# Patient Record
Sex: FEMALE | Race: WHITE | NOT HISPANIC OR LATINO | Employment: OTHER | URBAN - METROPOLITAN AREA
[De-identification: names, ages, dates, MRNs, and addresses within clinical notes are randomized per-mention and may not be internally consistent; named-entity substitution may affect disease eponyms.]

---

## 2020-08-13 ENCOUNTER — TRANSCRIBE ORDERS (OUTPATIENT)
Dept: ADMINISTRATIVE | Facility: HOSPITAL | Age: 30
End: 2020-08-13

## 2020-08-13 DIAGNOSIS — N64.4 PAINFUL BREASTS: ICD-10-CM

## 2020-08-13 DIAGNOSIS — N63.25 BREAST LUMP ON LEFT SIDE AT 3 O'CLOCK POSITION: Primary | ICD-10-CM

## 2020-08-27 ENCOUNTER — HOSPITAL ENCOUNTER (OUTPATIENT)
Dept: MAMMOGRAPHY | Facility: HOSPITAL | Age: 30
Discharge: HOME | End: 2020-08-27

## 2020-08-27 ENCOUNTER — HOSPITAL ENCOUNTER (OUTPATIENT)
Dept: ULTRASOUND IMAGING | Facility: HOSPITAL | Age: 30
Discharge: HOME | End: 2020-08-27
Admitting: FAMILY MEDICINE

## 2020-08-27 DIAGNOSIS — N64.4 PAINFUL BREASTS: ICD-10-CM

## 2020-08-27 DIAGNOSIS — N63.25 BREAST LUMP ON LEFT SIDE AT 3 O'CLOCK POSITION: ICD-10-CM

## 2020-08-27 PROCEDURE — 76642 ULTRASOUND BREAST LIMITED: CPT | Performed by: RADIOLOGY

## 2020-08-27 PROCEDURE — G0279 TOMOSYNTHESIS, MAMMO: HCPCS

## 2020-08-27 PROCEDURE — 76642 ULTRASOUND BREAST LIMITED: CPT

## 2020-08-27 PROCEDURE — 77066 DX MAMMO INCL CAD BI: CPT | Performed by: RADIOLOGY

## 2020-08-27 PROCEDURE — 77062 BREAST TOMOSYNTHESIS BI: CPT | Performed by: RADIOLOGY

## 2020-08-27 PROCEDURE — 77066 DX MAMMO INCL CAD BI: CPT

## 2024-11-26 ENCOUNTER — TRANSCRIBE ORDERS (OUTPATIENT)
Dept: LAB | Facility: HOSPITAL | Age: 34
End: 2024-11-26
Payer: COMMERCIAL

## 2024-11-26 ENCOUNTER — LAB (OUTPATIENT)
Dept: LAB | Facility: HOSPITAL | Age: 34
End: 2024-11-26
Payer: COMMERCIAL

## 2024-11-26 DIAGNOSIS — L05.91 INFECTED PILONIDAL CYST: Primary | ICD-10-CM

## 2024-11-26 DIAGNOSIS — L05.91 INFECTED PILONIDAL CYST: ICD-10-CM

## 2024-11-26 LAB
DEPRECATED RDW RBC AUTO: 38.3 FL (ref 37–54)
ERYTHROCYTE [DISTWIDTH] IN BLOOD BY AUTOMATED COUNT: 12.5 % (ref 12.3–15.4)
HCT VFR BLD AUTO: 45.1 % (ref 34–46.6)
HGB BLD-MCNC: 14.9 G/DL (ref 12–15.9)
MCH RBC QN AUTO: 28.6 PG (ref 26.6–33)
MCHC RBC AUTO-ENTMCNC: 33 G/DL (ref 31.5–35.7)
MCV RBC AUTO: 86.6 FL (ref 79–97)
PLATELET # BLD AUTO: 403 10*3/MM3 (ref 140–450)
PMV BLD AUTO: 11.4 FL (ref 6–12)
RBC # BLD AUTO: 5.21 10*6/MM3 (ref 3.77–5.28)
WBC NRBC COR # BLD AUTO: 11.39 10*3/MM3 (ref 3.4–10.8)

## 2024-11-26 PROCEDURE — 85027 COMPLETE CBC AUTOMATED: CPT

## 2024-11-26 PROCEDURE — 36415 COLL VENOUS BLD VENIPUNCTURE: CPT

## 2024-11-26 PROCEDURE — 80053 COMPREHEN METABOLIC PANEL: CPT

## 2024-11-27 LAB
ALBUMIN SERPL-MCNC: 4.2 G/DL (ref 3.5–5.2)
ALBUMIN/GLOB SERPL: 1.3 G/DL
ALP SERPL-CCNC: 62 U/L (ref 39–117)
ALT SERPL W P-5'-P-CCNC: 21 U/L (ref 1–33)
ANION GAP SERPL CALCULATED.3IONS-SCNC: 13.1 MMOL/L (ref 5–15)
AST SERPL-CCNC: 21 U/L (ref 1–32)
BILIRUB SERPL-MCNC: 0.2 MG/DL (ref 0–1.2)
BUN SERPL-MCNC: 23 MG/DL (ref 6–20)
BUN/CREAT SERPL: 21.7 (ref 7–25)
CALCIUM SPEC-SCNC: 9.6 MG/DL (ref 8.6–10.5)
CHLORIDE SERPL-SCNC: 103 MMOL/L (ref 98–107)
CO2 SERPL-SCNC: 22.9 MMOL/L (ref 22–29)
CREAT SERPL-MCNC: 1.06 MG/DL (ref 0.57–1)
EGFRCR SERPLBLD CKD-EPI 2021: 71.3 ML/MIN/1.73
GLOBULIN UR ELPH-MCNC: 3.3 GM/DL
GLUCOSE SERPL-MCNC: 102 MG/DL (ref 65–99)
POTASSIUM SERPL-SCNC: 4.1 MMOL/L (ref 3.5–5.2)
PROT SERPL-MCNC: 7.5 G/DL (ref 6–8.5)
SODIUM SERPL-SCNC: 139 MMOL/L (ref 136–145)

## 2024-12-03 ENCOUNTER — ANESTHESIA EVENT (OUTPATIENT)
Dept: PERIOP | Facility: HOSPITAL | Age: 34
End: 2024-12-03
Payer: COMMERCIAL

## 2024-12-03 RX ORDER — FAMOTIDINE 10 MG/ML
20 INJECTION, SOLUTION INTRAVENOUS ONCE
Status: CANCELLED | OUTPATIENT
Start: 2024-12-03 | End: 2024-12-03

## 2024-12-04 ENCOUNTER — ANESTHESIA (OUTPATIENT)
Dept: PERIOP | Facility: HOSPITAL | Age: 34
End: 2024-12-04
Payer: COMMERCIAL

## 2024-12-04 ENCOUNTER — HOSPITAL ENCOUNTER (OUTPATIENT)
Facility: HOSPITAL | Age: 34
Setting detail: HOSPITAL OUTPATIENT SURGERY
Discharge: HOME | End: 2024-12-04
Attending: SURGERY | Admitting: SURGERY
Payer: COMMERCIAL

## 2024-12-04 VITALS
DIASTOLIC BLOOD PRESSURE: 77 MMHG | TEMPERATURE: 98 F | BODY MASS INDEX: 26.68 KG/M2 | RESPIRATION RATE: 16 BRPM | WEIGHT: 170 LBS | OXYGEN SATURATION: 97 % | HEIGHT: 67 IN | HEART RATE: 76 BPM | SYSTOLIC BLOOD PRESSURE: 123 MMHG

## 2024-12-04 DIAGNOSIS — L05.91 PILONIDAL CYST: ICD-10-CM

## 2024-12-04 LAB
ALBUMIN SERPL-MCNC: 3.8 G/DL (ref 3.5–5.2)
ALBUMIN/GLOB SERPL: 1.1 G/DL
ALP SERPL-CCNC: 64 U/L (ref 39–117)
ALT SERPL W P-5'-P-CCNC: 20 U/L (ref 1–33)
ANION GAP SERPL CALCULATED.3IONS-SCNC: 12 MMOL/L (ref 5–15)
AST SERPL-CCNC: 26 U/L (ref 1–32)
B-HCG UR QL: NEGATIVE
BILIRUB SERPL-MCNC: 0.3 MG/DL (ref 0–1.2)
BUN SERPL-MCNC: 18 MG/DL (ref 6–20)
BUN/CREAT SERPL: 19.6 (ref 7–25)
CALCIUM SPEC-SCNC: 9 MG/DL (ref 8.6–10.5)
CHLORIDE SERPL-SCNC: 105 MMOL/L (ref 98–107)
CO2 SERPL-SCNC: 21 MMOL/L (ref 22–29)
CREAT SERPL-MCNC: 0.92 MG/DL (ref 0.57–1)
DEPRECATED RDW RBC AUTO: 37.8 FL (ref 37–54)
EGFRCR SERPLBLD CKD-EPI 2021: 84.5 ML/MIN/1.73
ERYTHROCYTE [DISTWIDTH] IN BLOOD BY AUTOMATED COUNT: 12.5 % (ref 12.3–15.4)
EXPIRATION DATE: NORMAL
GLOBULIN UR ELPH-MCNC: 3.6 GM/DL
GLUCOSE SERPL-MCNC: 92 MG/DL (ref 65–99)
HCT VFR BLD AUTO: 41.6 % (ref 34–46.6)
HGB BLD-MCNC: 14.5 G/DL (ref 12–15.9)
INTERNAL NEGATIVE CONTROL: NEGATIVE
INTERNAL POSITIVE CONTROL: POSITIVE
Lab: NORMAL
MCH RBC QN AUTO: 28.8 PG (ref 26.6–33)
MCHC RBC AUTO-ENTMCNC: 34.9 G/DL (ref 31.5–35.7)
MCV RBC AUTO: 82.7 FL (ref 79–97)
PLATELET # BLD AUTO: 348 10*3/MM3 (ref 140–450)
PMV BLD AUTO: 10 FL (ref 6–12)
POTASSIUM SERPL-SCNC: 4.3 MMOL/L (ref 3.5–5.2)
PROT SERPL-MCNC: 7.4 G/DL (ref 6–8.5)
RBC # BLD AUTO: 5.03 10*6/MM3 (ref 3.77–5.28)
SODIUM SERPL-SCNC: 138 MMOL/L (ref 136–145)
WBC NRBC COR # BLD AUTO: 9.66 10*3/MM3 (ref 3.4–10.8)

## 2024-12-04 PROCEDURE — 81025 URINE PREGNANCY TEST: CPT | Performed by: ANESTHESIOLOGY

## 2024-12-04 PROCEDURE — 25010000002 CEFAZOLIN PER 500 MG: Performed by: SURGERY

## 2024-12-04 PROCEDURE — 80053 COMPREHEN METABOLIC PANEL: CPT | Performed by: SURGERY

## 2024-12-04 PROCEDURE — 25010000002 FENTANYL CITRATE (PF) 100 MCG/2ML SOLUTION: Performed by: NURSE ANESTHETIST, CERTIFIED REGISTERED

## 2024-12-04 PROCEDURE — 85027 COMPLETE CBC AUTOMATED: CPT | Performed by: SURGERY

## 2024-12-04 PROCEDURE — 25010000002 LIDOCAINE PF 1% 1 % SOLUTION: Performed by: NURSE ANESTHETIST, CERTIFIED REGISTERED

## 2024-12-04 PROCEDURE — 25010000002 PROPOFOL 10 MG/ML EMULSION: Performed by: NURSE ANESTHETIST, CERTIFIED REGISTERED

## 2024-12-04 PROCEDURE — 25010000002 LIDOCAINE PF 1% 1 % SOLUTION: Performed by: ANESTHESIOLOGY

## 2024-12-04 PROCEDURE — 88304 TISSUE EXAM BY PATHOLOGIST: CPT | Performed by: SURGERY

## 2024-12-04 PROCEDURE — 25010000002 ESMOLOL 100 MG/10ML SOLUTION: Performed by: NURSE ANESTHETIST, CERTIFIED REGISTERED

## 2024-12-04 PROCEDURE — 25010000002 ONDANSETRON PER 1 MG: Performed by: NURSE ANESTHETIST, CERTIFIED REGISTERED

## 2024-12-04 PROCEDURE — 25810000003 LACTATED RINGERS PER 1000 ML: Performed by: ANESTHESIOLOGY

## 2024-12-04 PROCEDURE — 25010000002 DEXAMETHASONE PER 1 MG: Performed by: NURSE ANESTHETIST, CERTIFIED REGISTERED

## 2024-12-04 PROCEDURE — 25010000002 SUGAMMADEX 200 MG/2ML SOLUTION: Performed by: NURSE ANESTHETIST, CERTIFIED REGISTERED

## 2024-12-04 RX ORDER — MEPERIDINE HYDROCHLORIDE 25 MG/ML
12.5 INJECTION INTRAMUSCULAR; INTRAVENOUS; SUBCUTANEOUS
Status: DISCONTINUED | OUTPATIENT
Start: 2024-12-04 | End: 2024-12-04 | Stop reason: HOSPADM

## 2024-12-04 RX ORDER — ROCURONIUM BROMIDE 10 MG/ML
INJECTION, SOLUTION INTRAVENOUS AS NEEDED
Status: DISCONTINUED | OUTPATIENT
Start: 2024-12-04 | End: 2024-12-04 | Stop reason: SURG

## 2024-12-04 RX ORDER — DEXAMETHASONE SODIUM PHOSPHATE 4 MG/ML
INJECTION, SOLUTION INTRA-ARTICULAR; INTRALESIONAL; INTRAMUSCULAR; INTRAVENOUS; SOFT TISSUE AS NEEDED
Status: DISCONTINUED | OUTPATIENT
Start: 2024-12-04 | End: 2024-12-04 | Stop reason: SURG

## 2024-12-04 RX ORDER — LIDOCAINE HYDROCHLORIDE 10 MG/ML
0.5 INJECTION, SOLUTION EPIDURAL; INFILTRATION; INTRACAUDAL; PERINEURAL ONCE AS NEEDED
Status: COMPLETED | OUTPATIENT
Start: 2024-12-04 | End: 2024-12-04

## 2024-12-04 RX ORDER — SODIUM CHLORIDE, SODIUM LACTATE, POTASSIUM CHLORIDE, CALCIUM CHLORIDE 600; 310; 30; 20 MG/100ML; MG/100ML; MG/100ML; MG/100ML
9 INJECTION, SOLUTION INTRAVENOUS CONTINUOUS
Status: DISCONTINUED | OUTPATIENT
Start: 2024-12-05 | End: 2024-12-04 | Stop reason: HOSPADM

## 2024-12-04 RX ORDER — DROPERIDOL 2.5 MG/ML
0.62 INJECTION, SOLUTION INTRAMUSCULAR; INTRAVENOUS ONCE AS NEEDED
Status: DISCONTINUED | OUTPATIENT
Start: 2024-12-04 | End: 2024-12-04 | Stop reason: HOSPADM

## 2024-12-04 RX ORDER — L.ACID,CASEI/B.ANIMAL/S.THERMO 16B CELL
1 CAPSULE ORAL DAILY
COMMUNITY

## 2024-12-04 RX ORDER — SODIUM CHLORIDE 0.9 % (FLUSH) 0.9 %
10 SYRINGE (ML) INJECTION AS NEEDED
Status: DISCONTINUED | OUTPATIENT
Start: 2024-12-04 | End: 2024-12-04 | Stop reason: HOSPADM

## 2024-12-04 RX ORDER — FAMOTIDINE 20 MG/1
20 TABLET, FILM COATED ORAL ONCE
Status: COMPLETED | OUTPATIENT
Start: 2024-12-04 | End: 2024-12-04

## 2024-12-04 RX ORDER — ONDANSETRON 2 MG/ML
4 INJECTION INTRAMUSCULAR; INTRAVENOUS ONCE AS NEEDED
Status: DISCONTINUED | OUTPATIENT
Start: 2024-12-04 | End: 2024-12-04 | Stop reason: HOSPADM

## 2024-12-04 RX ORDER — FENTANYL CITRATE 50 UG/ML
INJECTION, SOLUTION INTRAMUSCULAR; INTRAVENOUS AS NEEDED
Status: DISCONTINUED | OUTPATIENT
Start: 2024-12-04 | End: 2024-12-04 | Stop reason: SURG

## 2024-12-04 RX ORDER — ESMOLOL HYDROCHLORIDE 10 MG/ML
INJECTION INTRAVENOUS AS NEEDED
Status: DISCONTINUED | OUTPATIENT
Start: 2024-12-04 | End: 2024-12-04 | Stop reason: SURG

## 2024-12-04 RX ORDER — OXYCODONE AND ACETAMINOPHEN 5; 325 MG/1; MG/1
1 TABLET ORAL EVERY 4 HOURS PRN
Qty: 10 TABLET | Refills: 0 | Status: SHIPPED | OUTPATIENT
Start: 2024-12-04

## 2024-12-04 RX ORDER — FENTANYL CITRATE 50 UG/ML
50 INJECTION, SOLUTION INTRAMUSCULAR; INTRAVENOUS
Status: DISCONTINUED | OUTPATIENT
Start: 2024-12-04 | End: 2024-12-04 | Stop reason: HOSPADM

## 2024-12-04 RX ORDER — MIDAZOLAM HYDROCHLORIDE 1 MG/ML
1 INJECTION, SOLUTION INTRAMUSCULAR; INTRAVENOUS
Status: DISCONTINUED | OUTPATIENT
Start: 2024-12-04 | End: 2024-12-04 | Stop reason: HOSPADM

## 2024-12-04 RX ORDER — OXYCODONE AND ACETAMINOPHEN 5; 325 MG/1; MG/1
1 TABLET ORAL ONCE AS NEEDED
Status: DISCONTINUED | OUTPATIENT
Start: 2024-12-04 | End: 2024-12-04 | Stop reason: HOSPADM

## 2024-12-04 RX ORDER — HYDROMORPHONE HYDROCHLORIDE 1 MG/ML
0.5 INJECTION, SOLUTION INTRAMUSCULAR; INTRAVENOUS; SUBCUTANEOUS
Status: DISCONTINUED | OUTPATIENT
Start: 2024-12-04 | End: 2024-12-04 | Stop reason: HOSPADM

## 2024-12-04 RX ORDER — DOCUSATE SODIUM 100 MG/1
100 CAPSULE, LIQUID FILLED ORAL 2 TIMES DAILY PRN
Qty: 30 CAPSULE | Refills: 1 | Status: SHIPPED | OUTPATIENT
Start: 2024-12-04 | End: 2025-12-04

## 2024-12-04 RX ORDER — BUPIVACAINE HYDROCHLORIDE AND EPINEPHRINE 2.5; 5 MG/ML; UG/ML
INJECTION, SOLUTION EPIDURAL; INFILTRATION; INTRACAUDAL; PERINEURAL AS NEEDED
Status: DISCONTINUED | OUTPATIENT
Start: 2024-12-04 | End: 2024-12-04 | Stop reason: HOSPADM

## 2024-12-04 RX ORDER — DROSPIRENONE AND ETHINYL ESTRADIOL 0.02-3(28)
1 KIT ORAL DAILY
COMMUNITY
Start: 2024-10-26

## 2024-12-04 RX ORDER — PROPOFOL 10 MG/ML
VIAL (ML) INTRAVENOUS AS NEEDED
Status: DISCONTINUED | OUTPATIENT
Start: 2024-12-04 | End: 2024-12-04 | Stop reason: SURG

## 2024-12-04 RX ORDER — LIDOCAINE HYDROCHLORIDE 10 MG/ML
INJECTION, SOLUTION EPIDURAL; INFILTRATION; INTRACAUDAL; PERINEURAL AS NEEDED
Status: DISCONTINUED | OUTPATIENT
Start: 2024-12-04 | End: 2024-12-04 | Stop reason: SURG

## 2024-12-04 RX ORDER — ONDANSETRON 2 MG/ML
INJECTION INTRAMUSCULAR; INTRAVENOUS AS NEEDED
Status: DISCONTINUED | OUTPATIENT
Start: 2024-12-04 | End: 2024-12-04 | Stop reason: SURG

## 2024-12-04 RX ORDER — SODIUM CHLORIDE 0.9 % (FLUSH) 0.9 %
10 SYRINGE (ML) INJECTION EVERY 12 HOURS SCHEDULED
Status: DISCONTINUED | OUTPATIENT
Start: 2024-12-04 | End: 2024-12-04

## 2024-12-04 RX ADMIN — ONDANSETRON 4 MG: 2 INJECTION INTRAMUSCULAR; INTRAVENOUS at 13:05

## 2024-12-04 RX ADMIN — ROCURONIUM BROMIDE 50 MG: 10 INJECTION INTRAVENOUS at 12:33

## 2024-12-04 RX ADMIN — SODIUM CHLORIDE 2000 MG: 900 INJECTION INTRAVENOUS at 12:37

## 2024-12-04 RX ADMIN — DEXAMETHASONE SODIUM PHOSPHATE 8 MG: 4 INJECTION INTRA-ARTICULAR; INTRALESIONAL; INTRAMUSCULAR; INTRAVENOUS; SOFT TISSUE at 12:37

## 2024-12-04 RX ADMIN — ESMOLOL HYDROCHLORIDE 50 MG: 10 INJECTION, SOLUTION INTRAVENOUS at 12:43

## 2024-12-04 RX ADMIN — FENTANYL CITRATE 100 MCG: 50 INJECTION, SOLUTION INTRAMUSCULAR; INTRAVENOUS at 12:33

## 2024-12-04 RX ADMIN — SUGAMMADEX 200 MG: 100 INJECTION, SOLUTION INTRAVENOUS at 13:16

## 2024-12-04 RX ADMIN — FENTANYL CITRATE 100 MCG: 50 INJECTION, SOLUTION INTRAMUSCULAR; INTRAVENOUS at 13:18

## 2024-12-04 RX ADMIN — LIDOCAINE HYDROCHLORIDE 0.5 ML: 10 INJECTION, SOLUTION EPIDURAL; INFILTRATION; INTRACAUDAL; PERINEURAL at 12:00

## 2024-12-04 RX ADMIN — PROPOFOL 200 MG: 10 INJECTION, EMULSION INTRAVENOUS at 12:33

## 2024-12-04 RX ADMIN — FAMOTIDINE 20 MG: 20 TABLET, FILM COATED ORAL at 12:00

## 2024-12-04 RX ADMIN — SODIUM CHLORIDE, POTASSIUM CHLORIDE, SODIUM LACTATE AND CALCIUM CHLORIDE: 600; 310; 30; 20 INJECTION, SOLUTION INTRAVENOUS at 12:29

## 2024-12-04 RX ADMIN — ESMOLOL HYDROCHLORIDE 50 MG: 10 INJECTION, SOLUTION INTRAVENOUS at 13:19

## 2024-12-04 RX ADMIN — LIDOCAINE HYDROCHLORIDE 50 MG: 10 INJECTION, SOLUTION EPIDURAL; INFILTRATION; INTRACAUDAL; PERINEURAL at 12:33

## 2024-12-04 NOTE — H&P
"Pre-Op H&P  Wilfred Francisco  [id number]  1990    Chief complaint: \"Recurrent pilonidal infections\"    HPI:    Patient is a 34 y.o.female who presents with problems with recurrent pilonidal cysts and infections.  She has failed conservative therapy.  She is now admitted for surgical intervention and resection.    Review of Systems:  General ROS: negative for chills, fever or skin lesions;  No changes since last office visit.  Neg for recent sick exposure  Cardiovascular ROS: no chest pain or dyspnea on exertion  Respiratory ROS: no cough, shortness of breath, or wheezing    Allergies: No Known Allergies    Home Meds:    No current facility-administered medications on file prior to encounter.     Current Outpatient Medications on File Prior to Encounter   Medication Sig Dispense Refill    Marion 3-0.02 MG per tablet Take 1 tablet by mouth Daily.      Probiotic Product (Risaquad-2) capsule capsule Take 1 capsule by mouth Daily.         PMH:   Past Medical History:   Diagnosis Date    Asthma     exercise induced asthma denies any issues since college (2014)    PCOS (polycystic ovarian syndrome)      PSH:    Past Surgical History:   Procedure Laterality Date    TONSILLECTOMY AND ADENOIDECTOMY      (4th grade)    WISDOM TOOTH EXTRACTION      2019     Patient denies allergy to contrast dye or latex  Immunization History:  Influenza: Yes  Pneumococcal: No  Tetanus: Up-to-date    Social History:   Tobacco:   Social History     Tobacco Use   Smoking Status Never    Passive exposure: Never   Smokeless Tobacco Never      Alcohol:     Social History     Substance and Sexual Activity   Alcohol Use Defer       Vitals:           /90 (BP Location: Right arm, Patient Position: Lying)   Pulse 91   Temp 97 °F (36.1 °C) (Axillary)   Resp 13   Ht 170.2 cm (67\")   Wt 77.1 kg (170 lb)   SpO2 98%   BMI 26.63 kg/m²     Physical Exam:  General Appearance:    Alert, cooperative, no distress, appears stated age   Head:    " Normocephalic, without obvious abnormality, atraumatic   Lungs:   Clear to auscultation bilaterally to the bases    Heart: S1 and S2 without rubs murmurs or gallops    Abdomen:  Soft, nontender, bowel sounds present throughout.   Breast Exam:    deferred   Genitalia:    deferred   Extremities:   Extremities normal, atraumatic, no cyanosis or edema   Skin:   Skin color, texture, turgor normal, no rashes or lesions   Neurologic:   Grossly intact   Results Review  LABS:  Lab Results   Component Value Date    WBC 9.66 12/04/2024    HGB 14.5 12/04/2024    HCT 41.6 12/04/2024    MCV 82.7 12/04/2024     12/04/2024    GLUCOSE 92 12/04/2024    BUN 18 12/04/2024    CREATININE 0.92 12/04/2024     12/04/2024    K 4.3 12/04/2024     12/04/2024    CO2 21.0 (L) 12/04/2024    CALCIUM 9.0 12/04/2024    ALBUMIN 3.8 12/04/2024    AST 26 12/04/2024    ALT 20 12/04/2024    BILITOT 0.3 12/04/2024       RADIOLOGY:  No radiology results for the last 3 days     I reviewed the patient's new clinical results.    Cancer Staging (if applicable)  Cancer Patient: __ yes __no __unknown; If yes, clinical stage T:__ N:__M:__, stage group or __N/A    Impression: Recurrent pilonidal cyst    Plan: Pilonidal cystectomy.      FAHAD Clement   12/04/24   1:40 PM EST

## 2024-12-04 NOTE — BRIEF OP NOTE
Cardiac Rehabilitation Home Exercise Prescription  Exercise Plan:  It is very beneficial for you to exercise at home on a regular basis.  The American Heart Association suggests at least 150 minutes per week of moderate exercise.  If you are interested in losing weight, 300 minutes or more per week of moderate exercise may be necessary.  Below is a list of guidelines for you to follow when exercising.     Warm-Up  The exercise session should always begin with at least 2-5  minutes of warming up.  This means starting out the aerobic exercise phase at a slow pace, gradually increasing the speed or intensity.     Aerobic Exercise  This phase consists of continuous, purposeful activity using larger muscle groups to elevate heart rate to a goal range.  [facility] exercise settings.    Mode of Exercise: Treadmill, Ambulation, Elliptical, Free weights  Frequency of Exercise: 5-7 days/week  Duration of Aerobic Exercise: 31-45 minutes  Intensity of Exercise (METS) - Current: (1.9) 2-4  Intensity Level - Rate of Perceived: 3-4 moderate to somewhat hard  Target Heart Rate Range: 30-50 beats above resting  Strength: 1 to 3 sets of 10 to 15 repetitions, Therabands, Weight machine  Progression: Duration will be advanced by 1-10 minutes every week, up to 60 minutes, METs will be advanced by 0.5-1.0 per week, Weights will be advanced 1-2 pounds once 10-15 repetitions can be lifted comfortably     What is a MET?  Metabolic Equivalency Table: Activities are categorized by the amount of energy it takes to perform them.  This energy level is measured in \"METs\".  One MET equals the amount of energy your body uses at rest and increases as you activity level increases.  For example: Sitting in a chair is 1 MET, doing laundry is 3 METs, 3mph/2% incline on a treadmill is 4 METs.     Cool-Down  The exercise session should always end with at least 2-5  minutes of cooling down. This means ending the aerobic exercise at a slow pace,  PILONIDAL CYSTECTOMY  Progress Note    Wilfred Francisco  12/4/2024    Pre-op Diagnosis:      * Pilonidal cyst        Post-Op Diagnosis Codes:     * Pilonidal cyst     Procedure/CPT® Codes:        Procedure(s):  PILONIDAL CYSTECTOMY WITH CALEB FLAP CLOSURE              Surgeon(s):  Macho Muñoz MD    Anesthesia: General    Staff:   Circulator: Christa Farah RN  Scrub Person: Luna Espinosa  Nursing Assistant: Daniela oGvea PCT  Assistant: Jean Carlos Barnes PA  Assistant: Jean Carlos Barnes PA      Estimated Blood Loss: minimal    Urine Voided: * No values recorded between 12/4/2024 12:29 PM and 12/4/2024  1:18 PM *    Specimens:                Specimens       ID Source Type Tests Collected By Collected At Frozen?    A Pilonidal cyst Tissue TISSUE PATHOLOGY EXAM   Macho Muñoz MD 12/4/24 1927 No    Description: PILONIDAL CYST                  Drains:   Closed/Suction Drain 1 Other (Comment) (Active)       Findings: Pilonidal cyst without active inflammation or infection removed completely grossly        Complications: None    Assistant: Jean Carlos Barnes PA  was responsible for performing the following activities: Retraction, Suturing, Closing, and Placing Dressing and their skilled assistance was necessary for the success of this case.    Macho Muñoz MD     Date: 12/4/2024  Time: 13:18 EST         gradually decreasing speed or intensity.  This should be followed by stretching to increase flexibility.  Hold each stretch for 15-30 seconds.     Always listen to your body!  During exercise, if you experience any of the following Signs and Symptoms STOP exercising and alert your physician:  Chest discomfort (that may spread to other areas of the body), lightheadedness/dizziness, unusual shortness of breath, unusual fatigue, excessive sweating, or nausea/vomiting.     Special Weather Considerations:  Avoid exercise outdoors if the temperature is less than 20 degrees or more than 80 degrees.  Remember to factor in wind chills and heat indexes. Also, stay hydrated and drink water, but consider prescribed fluid restriction.     Cardiac Rehabilitation Life Style Changes    Goals identified on first session visit:  Increase endurance  Return to daily and/or recreational activities  Develop a home exercise program  Lower blood pressure  Lose weight  Lower cholesterol    Tips to help you succeed with Lifestyle Change:  1. Take it one change at a time:  Do not tackle all your goals at once. Rank your risk factors and then decided which to change first.  2. Set realistic goals-set your long term goals and divide each long term goal into a short term goal.  3. Expect set-backs:  Make a plan to get back on track. A journal or log may help you stick to your plan.     Heart Healthy Diet Tips:  1. Eat a variety of foods every day. Include choices from all of the food groups.  2. Eat high-fiber foods such as fresh fruits, fresh vegetables, and whole grains.  3. Choose low-fat or reduced fat products and lean cut meats.   4. Control your portions and make healthy choices more often.  5. Read food labels and checking for serving size.  6. Try not to skip meals.  This may cause you to overeat at the next meal.    You may be receiving a [facility] Survey in the mail. Please consider taking the time to complete it  as your feedback is important to us in order to make improvements in our program. We always strive to provide very good care.

## 2024-12-04 NOTE — ANESTHESIA PREPROCEDURE EVALUATION
Anesthesia Evaluation     Patient summary reviewed and Nursing notes reviewed   no history of anesthetic complications:   NPO Solid Status: > 8 hours  NPO Liquid Status: > 2 hours           Airway   Mallampati: II  TM distance: >3 FB  Neck ROM: full  No difficulty expected  Dental - normal exam     Pulmonary - negative pulmonary ROS   (-) COPD, asthma, sleep apnea, not a smoker  Cardiovascular - negative cardio ROS    (-) dysrhythmias, angina, CHF, orthopnea, cardiac stents      Neuro/Psych  (-) seizures, CVA  GI/Hepatic/Renal/Endo    (-) liver disease, no renal disease, diabetes, no thyroid disorder    Musculoskeletal     Abdominal    Substance History      OB/GYN          Other                          Anesthesia Plan    ASA 1     general     intravenous induction     Anesthetic plan, risks, benefits, and alternatives have been provided, discussed and informed consent has been obtained with: patient.    Plan discussed with CRNA.        CODE STATUS:

## 2024-12-04 NOTE — OP NOTE
General Surgery Operative Note    PILONIDAL CYSTECTOMY  Procedure Report    Patient Name:  Wilfred Francisco  YOB: 1990  [id number]     Date of Surgery:  12/4/2024       Pre-op Diagnosis: Pilonidal cyst    Post-op Diagnosis: Pilonidal cyst    Procedure(s):  PILONIDAL CYSTECTOMY WITH CALEB FLAP CLOSURE    Surgeon: Macho Muñoz MD    Assistant: Jean Carlos Barnes PA     Anesthesia: General         Estimated Blood Loss: minimal    Complications: None     Implants:    Nothing was implanted during the procedure    Specimen:          Specimens       ID Source Type Tests Collected By Collected At Frozen?    A Pilonidal cyst Tissue TISSUE PATHOLOGY EXAM   Macho Muñoz MD 12/4/24 2543 No    Description: PILONIDAL CYST                Findings: Pilonidal cyst without active inflammation or infection removed completely grossly    Indications: The patient is a 34-year-old female with history of recurrent infections of pilonidal cyst.  We discussed the risks, benefits, and alternatives to pilonidal cystectomy with flap closure and the patient was agreeable.  Informed consent was obtained.    Description of Procedure:     The borders of her gluteal fold were marked in the preoperative area.  After obtaining informed consent, the patient was taken to the operating room. After appropriate DVT and antibiotic prophylaxis, general anesthesia was induced.  The patient was then placed in the prone position with appropriate padding in place.  The sacrum and perineum was prepped and draped in standard sterile fashion.    Using a marking pen an incision was marked to allow for Caleb flap closure and incorporating all pilonidal cysts.  The area was then anesthetized using local anesthetic.  The skin was incised using 15 blade.  The dermis and subcu tissue were divided using Bovie electrocautery beyond the level of the pilonidal cysts.  The specimen was then removed in the body and sent to pathology as a  permanent specimen.  The soft tissue was undermined in all directions using Bovie electrocautery.  The deep soft tissue was reapproximated using interrupted 4-0 PDS suture.  A vessel loop was placed through a separate stab incision superior to the incision as a drain.  The more superficial soft tissue was reapproximated over this vessel loop using interrupted 4-0 PDS suture.  The deep dermis was reapproximated using interrupted 3-0 Vicryl.  The skin was reapproximated using a running subcuticular 4-0 Monocryl.  Skin glue was placed over the skin incision.  The vessel loop was tied to itself.  A dry dressing was placed over this.    All sponge and needle counts were correct times two at the completion of the procedure. The patient recovered from anesthesia, was extubated in the operating room and was transported to the PACU in stable condition.        Assistant: Jean Carlos Barnes PA  was responsible for performing the following activities: Retraction, Suturing, Closing, and Placing Dressing and their skilled assistance was necessary for the success of this case.    Macho Muñoz MD     Date: 12/4/2024  Time: 13:22 EST

## 2024-12-04 NOTE — ANESTHESIA PROCEDURE NOTES
Airway  Urgency: elective    Date/Time: 12/4/2024 12:35 PM  Airway not difficult    General Information and Staff    Patient location during procedure: OR  CRNA/CAA: Aurelio Martinez CRNA    Indications and Patient Condition  Indications for airway management: airway protection    Preoxygenated: yes  MILS not maintained throughout  Mask difficulty assessment: 1 - vent by mask    Final Airway Details  Final airway type: endotracheal airway      Successful airway: ETT  Cuffed: yes   Successful intubation technique: direct laryngoscopy  Endotracheal tube insertion site: oral  Blade: Manzo  Blade size: 2  ETT size (mm): 7.0  Cormack-Lehane Classification: grade I - full view of glottis  Placement verified by: chest auscultation and capnometry   Cuff volume (mL): 6  Measured from: lips  ETT/EBT  to lips (cm): 20  Number of attempts at approach: 1  Assessment: lips, teeth, and gum same as pre-op and atraumatic intubation    Additional Comments  Negative epigastric sounds, Breath sound equal bilaterally with symmetric chest rise and fall

## 2024-12-04 NOTE — ANESTHESIA POSTPROCEDURE EVALUATION
Patient: Wilfred Francisco    Procedure Summary       Date: 12/04/24 Room / [facility]:  [facility]    Anesthesia Start: 1229 Anesthesia Stop: 1328    Procedure: PILONIDAL CYSTECTOMY (Back) Diagnosis: Pilonidal cyst    Surgeons: Macho Muñoz MD Provider: Soy Eisenberg MD    Anesthesia Type: general ASA Status: 1            Anesthesia Type: general    Vitals  No vitals data found for the desired time range.          Post Anesthesia Care and Evaluation    Patient location during evaluation: PACU  Patient participation: complete - patient participated  Level of consciousness: awake and alert  Pain management: adequate    Airway patency: patent  Anesthetic complications: No anesthetic complications  PONV Status: none  Cardiovascular status: hemodynamically stable and acceptable  Respiratory status: nonlabored ventilation, acceptable and room air  Hydration status: acceptable

## 2024-12-04 NOTE — H&P
Pre-Op H&P  Wilfred Francisco  [id number]  1990      Chief complaint: Pilonidal Cyst      Subjective:  Patient is a 34 y.o.female presents for scheduled surgery by Dr. Muñoz. She anticipates a PILONIDAL CYSTECTOMY today.      Review of Systems:  Constitutional-- No fever, chills or sweats. No fatigue.  CV-- No chest pain, palpitation or syncope  Resp-- No SOB, cough, hemoptysis  Skin--No rashes or lesions      Allergies: Not on File      Home Meds:  No medications prior to admission.         PMH: No past medical history on file.  PSH:  No past surgical history on file.    Immunization History:  Influenza: ***  Pneumococcal: ***  Tetanus: ***  Covid : ***    Social History:   Tobacco:   Social History     Tobacco Use   Smoking Status Not on file   Smokeless Tobacco Not on file      Alcohol:     Social History     Substance and Sexual Activity   Alcohol Use Not on file         Physical Exam:There were no vitals taken for this visit.      General Appearance:    Alert, cooperative, no distress, appears stated age   Head:    Normocephalic, without obvious abnormality, atraumatic   Lungs:     Clear to auscultation bilaterally, respirations unlabored    Heart:   Regular rate and rhythm, S1 and S2 normal    Abdomen:    Soft without tenderness   Extremities:   Extremities normal, atraumatic, no cyanosis or edema   Skin:   Skin color, texture, turgor normal, no rashes or lesions   Neurologic:   Grossly intact     Results Review:     LABS:  Lab Results   Component Value Date    WBC 11.39 (H) 11/26/2024    HGB 14.9 11/26/2024    HCT 45.1 11/26/2024    MCV 86.6 11/26/2024     11/26/2024    GLUCOSE 102 (H) 11/26/2024    BUN 23 (H) 11/26/2024    CREATININE 1.06 (H) 11/26/2024     11/26/2024    K 4.1 11/26/2024     11/26/2024    CO2 22.9 11/26/2024    CALCIUM 9.6 11/26/2024    ALBUMIN 4.2 11/26/2024    AST 21 11/26/2024    ALT 21 11/26/2024    BILITOT 0.2 11/26/2024       RADIOLOGY:  Imaging Results  (Last 72 Hours)       ** No results found for the last 72 hours. **            I reviewed the patient's new clinical results.    Cancer Staging (if applicable)  Cancer Patient: __ yes _x_no __unknown; If yes, clinical stage T:__ N:__M:__, stage group or __N/A      Impression: Pilonidal cyst      Plan: PILONIDAL CYSTECTOMY       Sandro Cameron, APRN   12/4/2024   11:31 EST

## 2024-12-05 LAB
CYTO UR: NORMAL
LAB AP CASE REPORT: NORMAL
LAB AP CLINICAL INFORMATION: NORMAL
PATH REPORT.FINAL DX SPEC: NORMAL
PATH REPORT.GROSS SPEC: NORMAL

## (undated) DEVICE — UNDERGLV SURG BIOGEL INDICATOR LF PF 7.5

## (undated) DEVICE — SUT PDS 4/0 RB1 27IN VIL PDP304H

## (undated) DEVICE — PK MINOR SPLT 10

## (undated) DEVICE — TP PAPR MICROPORE 2IN

## (undated) DEVICE — Device

## (undated) DEVICE — TRAP FLD MINIVAC MEGADYNE 100ML

## (undated) DEVICE — PREMIUM DRY TRAY LF: Brand: MEDLINE INDUSTRIES, INC.

## (undated) DEVICE — PENCL SMOKE/EVAC MEGADYNE TELESCP 10FT

## (undated) DEVICE — GLV SURG BIOGEL LTX PF 7

## (undated) DEVICE — ANTIBACTERIAL UNDYED BRAIDED (POLYGLACTIN 910), SYNTHETIC ABSORBABLE SUTURE: Brand: COATED VICRYL

## (undated) DEVICE — SUT MNCRYL PLS ANTIB UD 4/0 PS2 18IN

## (undated) DEVICE — SUT ETHLN 3/0 PC5 18IN 1893G

## (undated) DEVICE — INTENDED TO BE USED TO OCCLUDE, RETRACT AND IDENTIFY ARTERIES, VEINS, TENDONS AND NERVES IN SURGICAL PROCEDURES: Brand: STERION®  VESSEL LOOP

## (undated) DEVICE — PATIENT RETURN ELECTRODE, SINGLE-USE, CONTACT QUALITY MONITORING, ADULT, WITH 9FT CORD, FOR PATIENTS WEIGING OVER 33LBS. (15KG): Brand: MEGADYNE

## (undated) DEVICE — SPNG GZ WOVN 4X4IN 12PLY 10/BX STRL